# Patient Record
Sex: MALE | Race: WHITE | Employment: STUDENT | URBAN - METROPOLITAN AREA
[De-identification: names, ages, dates, MRNs, and addresses within clinical notes are randomized per-mention and may not be internally consistent; named-entity substitution may affect disease eponyms.]

---
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
## 2023-01-05 ENCOUNTER — OFFICE VISIT (OUTPATIENT)
Dept: PHYSICAL THERAPY | Facility: HOSPITAL | Age: 23
End: 2023-01-05
Attending: ORTHOPAEDIC SURGERY
Payer: COMMERCIAL

## 2023-01-05 ENCOUNTER — ORDER TRANSCRIPTION (OUTPATIENT)
Dept: PHYSICAL THERAPY | Facility: HOSPITAL | Age: 23
End: 2023-01-05

## 2023-01-05 DIAGNOSIS — M25.572 PAIN IN LEFT ANKLE: ICD-10-CM

## 2023-01-05 DIAGNOSIS — S93.402A SPRAIN OF LEFT ANKLE, UNSPECIFIED LIGAMENT, INITIAL ENCOUNTER: ICD-10-CM

## 2023-01-05 DIAGNOSIS — M25.572 PAIN IN LEFT ANKLE: Primary | ICD-10-CM

## 2023-01-05 PROCEDURE — 97110 THERAPEUTIC EXERCISES: CPT

## 2023-01-05 PROCEDURE — 97161 PT EVAL LOW COMPLEX 20 MIN: CPT

## 2023-01-09 ENCOUNTER — APPOINTMENT (OUTPATIENT)
Dept: PHYSICAL THERAPY | Facility: HOSPITAL | Age: 23
End: 2023-01-09
Attending: ORTHOPAEDIC SURGERY
Payer: COMMERCIAL

## 2023-05-15 NOTE — PROGRESS NOTES
Mitzi Rasheed is a 14year old male. HPI:   Pt. States that he has a rib on his left side that is bothering him since playing [employer] golf 1 month. Pain is 2-3/10. Notes it certan positions. Notes popping in his neck as well. He has pectus excavatum.   He placed in this encounter. Meds & Refills for this Visit:    No prescriptions requested or ordered in this encounter       Imaging & Consults:  None   Monitor. Massage in 1 week. Shown posture and ergonomics.     The patient indicates understanding o [FreeTextEntry1] : 04/04/2023\par Pulmonary function testing\par There is a moderate ventilatory impairment in a restrictive pattern There is elevation in the RV/TLC ratio indicative of possible air trapping. There is a moderate diffusion impairment. Corrects to normal with lung volume correction \par No significant change compared to September 2022.  Mild decrement in function dating back to 2019.\par \par March 8 th 2023 ct chest reviewed and discussed with pt. compared to 2020.\par Relatively stable significant bronchiectasis.\par \par

## (undated) NOTE — MR AVS SNAPSHOT
[facility], [address] [id number] [phone]               Thank you for choosing us for your health care visit with Krissy Patel DO.   We are glad to serve you and happy to provide you with this summary Sign up for [employer] access for your child. [employer] access allows you to view health information for your child from their recent   visit, view other health information and more.   To sign up or find more information on getting   Proxy Access to your child In addition to 5, 4, 3, 2, 1 families can make small changes in their family routines to help everyone lead healthier active lives.  Try:  o Eating breakfast everyday  o Eating low-fat dairy products like yogurt, milk, and cheese  o Regularly eating meals t

## (undated) NOTE — LETTER
Patient Name: Jes Kamara  YOB: 2000          MRN number:  [id number]  Date:  6/27/2019  Referring Physician:  Samuel Reyes        POST-OP SHOULDER EVALUATION:    Referring Physician: Dr. Natalya Cortes  Diagnosis: Labral tear of should including the following: pain, decreased ROM, decreased GHJ mobility, decreased soft tissue mobility, decreased flexibility, decreased shoulder and parascapular strength, decreased shoulder and scapular stabilization.  Patient will benefit from physical the mechanics prior to progressing strengthening as patient is currently demonstrating significant scapular dyskinesia - this will require us to take a step back and return to fundamental exercises prior to returning to higher resistance levels and more advanc re-education, modalities as needed, Kinesiology taping as needed.     Education or treatment limitation: None  Rehab Potential:good    FOTO: 59/100    Patient/Family/Caregiver was advised of these findings, precautions, and treatment options and has agreed

## (undated) NOTE — ED AVS SNAPSHOT
Nessa Grajeda   MRN: [id number]    Department:  [facility] Emergency Department   Date of Visit:  3/30/2018           Disclosure     Insurance plans vary and the physician(s) referred by the ER may not be covered by your plan.  Please contact yo tell this physician (or your personal doctor if your instructions are to return to your personal doctor) about any new or lasting problems. The primary care or specialist physician will see patients referred from the [facility] Emergency Department.  Nessa Perdomo

## (undated) NOTE — MR AVS SNAPSHOT
[facility] [address], [address] [id number] [id number]               Thank you for choosing us for your health care visit with Paradise Tran DO.   We are glad to serve you and happy to provide you with this summary Call [phone] for help. [facility] is NOT to be used for urgent needs. For medical emergencies, dial 911.                Visit [facility] online at  [email or web address]